# Patient Record
Sex: MALE | Race: WHITE | NOT HISPANIC OR LATINO | Employment: OTHER | URBAN - METROPOLITAN AREA
[De-identification: names, ages, dates, MRNs, and addresses within clinical notes are randomized per-mention and may not be internally consistent; named-entity substitution may affect disease eponyms.]

---

## 2022-05-02 ENCOUNTER — TRANSFERRED RECORDS (OUTPATIENT)
Dept: HEALTH INFORMATION MANAGEMENT | Facility: CLINIC | Age: 77
End: 2022-05-02
Payer: COMMERCIAL

## 2022-06-28 DIAGNOSIS — Z96.1 PSEUDOPHAKIA OF BOTH EYES: ICD-10-CM

## 2022-06-28 DIAGNOSIS — H25.813 COMBINED FORMS OF AGE-RELATED CATARACT, BILATERAL: Primary | ICD-10-CM

## 2022-06-28 DIAGNOSIS — Q12.0 CONGENITAL NUCLEAR CATARACT: ICD-10-CM

## 2022-06-28 DIAGNOSIS — H25.019: ICD-10-CM

## 2022-06-28 DIAGNOSIS — H53.8 BLURRED VISION, BILATERAL: ICD-10-CM

## 2022-06-28 DIAGNOSIS — H25.23 AGE-RELATED CATARACT, MORGAGNIAN TYPE, BILATERAL: ICD-10-CM

## 2022-06-28 DIAGNOSIS — H52.13 MYOPIA OF BOTH EYES: ICD-10-CM

## 2022-06-29 ENCOUNTER — DOCUMENTATION ONLY (OUTPATIENT)
Dept: CARE COORDINATION | Facility: CLINIC | Age: 77
End: 2022-06-29

## 2022-06-29 ENCOUNTER — OFFICE VISIT (OUTPATIENT)
Dept: OPHTHALMOLOGY | Facility: CLINIC | Age: 77
End: 2022-06-29
Attending: OPHTHALMOLOGY
Payer: COMMERCIAL

## 2022-06-29 DIAGNOSIS — H01.01B ULCERATIVE BLEPHARITIS OF UPPER AND LOWER EYELIDS OF BOTH EYES: ICD-10-CM

## 2022-06-29 DIAGNOSIS — H52.13 MYOPIA OF BOTH EYES WITH ASTIGMATISM AND PRESBYOPIA: ICD-10-CM

## 2022-06-29 DIAGNOSIS — H01.01A ULCERATIVE BLEPHARITIS OF UPPER AND LOWER EYELIDS OF BOTH EYES: ICD-10-CM

## 2022-06-29 DIAGNOSIS — H25.813 COMBINED FORMS OF AGE-RELATED CATARACT OF BOTH EYES: Primary | ICD-10-CM

## 2022-06-29 DIAGNOSIS — C67.9 MALIGNANT NEOPLASM OF URINARY BLADDER, UNSPECIFIED SITE (H): ICD-10-CM

## 2022-06-29 DIAGNOSIS — H52.4 MYOPIA OF BOTH EYES WITH ASTIGMATISM AND PRESBYOPIA: ICD-10-CM

## 2022-06-29 DIAGNOSIS — H52.203 MYOPIA OF BOTH EYES WITH ASTIGMATISM AND PRESBYOPIA: ICD-10-CM

## 2022-06-29 PROCEDURE — 99203 OFFICE O/P NEW LOW 30 MIN: CPT | Performed by: OPHTHALMOLOGY

## 2022-06-29 PROCEDURE — G0463 HOSPITAL OUTPT CLINIC VISIT: HCPCS | Mod: 25

## 2022-06-29 RX ORDER — ATORVASTATIN CALCIUM 40 MG/1
40 TABLET, FILM COATED ORAL
COMMUNITY
Start: 2021-10-28 | End: 2023-10-13

## 2022-06-29 RX ORDER — BETAMETHASONE DIPROPIONATE 0.5 MG/G
CREAM TOPICAL
COMMUNITY
Start: 2022-04-08 | End: 2022-10-27

## 2022-06-29 RX ORDER — DONEPEZIL HYDROCHLORIDE 10 MG/1
10 TABLET, FILM COATED ORAL
COMMUNITY
Start: 2022-05-03

## 2022-06-29 RX ORDER — ASPIRIN 81 MG/1
81 TABLET, CHEWABLE ORAL
COMMUNITY

## 2022-06-29 RX ORDER — LOPERAMIDE HCL 2 MG
CAPSULE ORAL
COMMUNITY
Start: 2021-12-21 | End: 2022-12-21

## 2022-06-29 RX ORDER — MEMANTINE HYDROCHLORIDE 10 MG/1
1 TABLET ORAL 2 TIMES DAILY
COMMUNITY
Start: 2022-05-03

## 2022-06-29 RX ORDER — DIVALPROEX SODIUM 125 MG/1
125 CAPSULE, COATED PELLETS ORAL
COMMUNITY
Start: 2022-02-10 | End: 2022-10-27

## 2022-06-29 RX ORDER — FOLIC ACID 1 MG/1
1 TABLET ORAL
COMMUNITY
Start: 2022-02-08

## 2022-06-29 RX ORDER — ATORVASTATIN CALCIUM 40 MG/1
TABLET, FILM COATED ORAL
COMMUNITY
Start: 2022-06-01 | End: 2022-10-27

## 2022-06-29 RX ORDER — DIAZEPAM 10 MG
TABLET ORAL
COMMUNITY
Start: 2022-04-04

## 2022-06-29 ASSESSMENT — TONOMETRY
OD_IOP_MMHG: 12
OS_IOP_MMHG: 13
IOP_METHOD: ICARE

## 2022-06-29 ASSESSMENT — VISUAL ACUITY: METHOD: SNELLEN - LINEAR

## 2022-06-29 ASSESSMENT — CONF VISUAL FIELD: COMMENTS: UNABLE.

## 2022-06-29 ASSESSMENT — SLIT LAMP EXAM - LIDS
COMMENTS: 1+ BLEPHARITIS
COMMENTS: 1+ BLEPHARITIS

## 2022-06-29 NOTE — NURSING NOTE
Chief Complaints and History of Present Illnesses   Patient presents with     Cataract Evaluation     Chief Complaint(s) and History of Present Illness(es)     Cataract Evaluation               Comments     Pt here with his wife today. Per wife, pt has dementia and sometimes becomes combative.  Pt here for cataract eval today, possibly needs anesthesia for Cataract surgery.  Per wife pt states that pt is having difficulty walking because he is unable to see. Pt having difficulty with contrast and depth perception.  No DM.    JULIANN Colbert June 29, 2022 10:40 AM

## 2022-06-29 NOTE — PROGRESS NOTES
Social Work Documentation Only  [facility]    Data/Intervention:  Patient Name:  Barak Houser  /Age:  1945 (76 year old)    Reason for Documentation: Post-Op Planning      Collaborated With:    - Dr. Ku    Intervention/Education/Resources Provided:  Social work referral placed for post-op planning. Per report patient will need inpatient post-op stay with 1:1 nursing.  is unable to approve hospital admissions. Updated provider and gave contact information for Utilization Review for further guidance.     Assessment/Plan:  No current plan to follow-up with patient at this time as concerns are being addressed elsewhere. Encouraged interdisciplinary team to reach out with any further social work needs that arise.    NAEL Coelho/[facility]   [facility]   Phone: [phone]  Pager: [phone]

## 2022-06-29 NOTE — PROGRESS NOTES
HPI     Pt here with his wife today. Per wife, pt has dementia and sometimes becomes combative.  Pt here for cataract eval today, possibly needs anesthesia for Cataract surgery.  Per wife pt states that pt is having difficulty walking because he is unable to see. Pt having difficulty with contrast and depth perception.  No DM.    JULIANN Colbert June 29, 2022 10:40 AM          Last edited by Yuri Galvez COMT on 6/29/2022 10:44 AM. (History)         Review of systems for the eyes was negative other than the pertinent positives/negatives listed in the HPI.      Assessment & Plan    HPI:  Barak Houser is a 76 year old male with history of Alzheimer's Dementia and bladder cancer presents for cataract evaluation from Dr. Hernandez,       POHx: denies prior ocular surgeries  PMHx:   Current Medications: aspirin (ASA) 81 MG chewable tablet, Take 81 mg by mouth  atorvastatin (LIPITOR) 40 MG tablet, Take 40 mg by mouth  atorvastatin (LIPITOR) 40 MG tablet,   augmented betamethasone dipropionate (DIPROLENE AF) 0.05 % external cream,   diazepam (VALIUM) 10 MG tablet, TAKE 1 TABLET BY MOUTH 30 MINUTES PRIOR TO MRI  divalproex sodium delayed-release (DEPAKOTE SPRINKLE) 125 MG DR capsule, Take 125 mg by mouth  donepezil (ARICEPT) 10 MG tablet, Take 10 mg by mouth  folic acid (FOLVITE) 1 MG tablet, Take 1 mg by mouth  loperamide (IMODIUM) 2 MG capsule, TAKE 2 CAPSULES BY MOUTH INITIALLY, THEN ONE CAPSULE BY MOUTH AFTER EACH LOOSE STOOL. MAXIMUM 8 DOSES PER DAY.  memantine (NAMENDA) 10 MG tablet, Take 1 tablet by mouth 2 times daily  methotrexate 2.5 MG tablet,     No current facility-administered medications on file prior to visit.    FHx: denies family history of ocular conditions   PSHx: denies history of ocular surgeries       Current Eye Medications:      Assessment & Plan:  (H25.813) Combined forms of age-related cataract of both eyes  (primary encounter diagnosis)  Special equipment/needs:  Eye:  ocp        Last Written Prescription Date: 09-05-16  Last Fill Quantity: 84,  # refills: 3   Last Office Visit with G, P or [facility] prescribing provider: 12-27-13    Bianca Pritchard  [facility] RN                                                Left  Anesthesia: General  Dilates to: 7mm  Iris expansion:  No  Pseudoexfoliation: No  Trypan Blue: Yes  Trauma: No    Able lay to flat: Yes  Blood Thinner: No previously on ASA   Tamsulosin: No  DM: No  Guttae: No    Dominant Eye: right    Plan: [place] goal    We discussed the benefits, alternatives, and risks to cataract surgery, including blindness, decreased vision, and need for additional surgeries; and informed consent was obtained.  Proceed with CE/IOL left eye, possible right same day    Consider primary posterior capsulotomy    Needs general anesthesia and inpatient post-op stay with 1:1 nursing    (C67.9) Malignant neoplasm of urinary bladder, unspecified site (H)  No flomax history per wife and epic    (H01.01A,  H01.01B) Ulcerative blepharitis of upper and lower eyelids of both eyes    (H52.13,  H52.203,  H52.4) Myopia of both eyes with astigmatism and presbyopia  Prior MR right eye -3.25+2.25x037, left eye -0.25+0.50x165        No follow-ups on file.        Abdifatah Ku MD     Attending Physician Attestation:  Complete documentation of historical and exam elements from today's encounter can be found in the full encounter summary report (not reduplicated in this progress note).  I personally obtained the chief complaint(s) and history of present illness.  I confirmed and edited as necessary the review of systems, past medical/surgical history, family history, social history, and examination findings as documented by others; and I examined the patient myself.  I personally reviewed the relevant tests, images, and reports as documented above.  I formulated and edited as necessary the assessment and plan and discussed the findings and management plan with the patient and family. - Abdifatah Ku MD

## 2022-08-03 ENCOUNTER — TELEPHONE (OUTPATIENT)
Dept: OPHTHALMOLOGY | Facility: CLINIC | Age: 77
End: 2022-08-03

## 2022-08-03 NOTE — TELEPHONE ENCOUNTER
Tonie Houser [phone]      -reviewed by surgery scheduler  Reviewed with daughter surgery scheduling working on coordinating with [facility] OR and taking time for approval/finding time    Pt at memory care and needing overnight stay also per note    Reviewed earliest likely to confirm would be next week    Daughter verbally demonstrated understanding    Henrique Hernandez, RN 2:06 PM 08/03/22

## 2022-08-03 NOTE — TELEPHONE ENCOUNTER
Note to Dr. Ku to ensure all orders in place and surgery scheduler to reach out for scheduling    Henrique Hernandez RN 1:50 PM 08/03/22          [facility]    Phone Message    May a detailed message be left on voicemail: yes     Reason for Call: Other: Per Pts wife Tonie , someone was suppose to reach out to them to schedule a surgery but she has not heard anything since pts last appt . Pts referring provider is pressing that he have this surgery and to follow up on an update on if pt is still a canidate for the surgery . Please reach out to pts wife to discuss as soon as possible . Thank you       Action Taken: Other: eye     Travel Screening: Not Applicable

## 2022-08-15 ENCOUNTER — TELEPHONE (OUTPATIENT)
Dept: OPHTHALMOLOGY | Facility: CLINIC | Age: 77
End: 2022-08-15

## 2022-08-15 NOTE — TELEPHONE ENCOUNTER
Called patient to schedule surgery with Dr. Ku    Date of Surgery: 10/31    Location of surgery: [facility]    Pre-Op H&P: PCP/facility    Imaging Scheduled:  No    Discussed COVID-19 Testing: No Patient tested positive last week does not need a new test. Patients wife will get proof of positive    Post-Op Appt Date: 11/1,11/16,11/30    Surgery Packet Mailed: yes      Additional comments: Spoke with patients wife to schedule she was very happy, will review packet and call with any questions.        Anna C. Schoenecker on 8/15/2022 at 11:41 AM

## 2022-09-07 ENCOUNTER — TELEPHONE (OUTPATIENT)
Dept: OPHTHALMOLOGY | Facility: CLINIC | Age: 77
End: 2022-09-07

## 2022-09-07 NOTE — TELEPHONE ENCOUNTER
Patients Nursing home reached out to go over care instruction that will be needed post operatively and request a call back from MD or RN.    [phone]- ask for Marla Anna C. Schoenecker on 9/7/2022 at 12:26 PM

## 2022-09-30 ENCOUNTER — PATIENT OUTREACH (OUTPATIENT)
Dept: CARE COORDINATION | Facility: CLINIC | Age: 77
End: 2022-09-30

## 2022-09-30 NOTE — PROGRESS NOTES
"Social Work Assessment and Intervention  [facility]    Data/Intervention:    Patient Name:  Barak Houser  /Age:  1945 (76 year old)    Visit Type: telephone  Referral Source: Eye clinic  Reason for Referral:  Care after cataract surgery with anaesthesia, need for 24/7 care post-op    Collaborated With:    -Barak's wife Tonie Jessicatangela      Psychosocial Data/Patient Concerns/Issues:     Living Situation: Barak lives in a memory care [facility] in [place]. Tonie discussed there being only a couple of nurses, a CNA, and LPN on staff and there are a lot of residents for them to care for so staff there will not be able to be with Barak 24/7 after surgery. Tonie did say she agrees that Barak will need someone with him 24/7 post-op and reported thinking that Dr. Ku told her Barak will need someone with him 24/7 for 5 days post-op.     Tonie did note that the [facility] staff will do Barak's eye drops after surgery.     Tonie noted that Barak has a walker but it has been hard for him to use it due to his vision problems.     Family/Support Network: Tonie noted that she works two part times jobs, which amounts to 6 days/week but she is very involved in Barak's life and very supportive.     I asked if there are children who could be a part of a caregiver rotation schedule and Tonie reported she and Barak do not have children. Tonie also noted that their friends are all working, so there really is no family/friend caregiver rotation option.     Tonie reported really wanting Barak to get cataract surgery done so he can see better, walk as much as possible, and noted he is still getting annia out of life and \"everyone loves him\". Tonie reported really wanting Barak to get the surgery but not wanting it to be a catastrophe.   Tonie discussed her plan of getting an eye patch/shield like Barak will wear post-op (from [employer] or a pharmacy) and doing a trial run with this maybe the week " prior to surgery to see how it goes. Tonie noted that Barak has never been bothered by anything like a bandaid before and didn't touch his incision after having abdominal surgery. Tonie did note she has seen Barak rubbing his eyes recently when he wakes up in the morning and said she thinks he is trying to rub the blurriness away.     Tonie noted she has already done eye drops for Barak and did a type of eye dressing the other day as well.     Financial/Insurance: Barak has [facility] Medicare Advantage insurance and I explained that Barak would need insurance approval to go to a TCU if return to [facility] with private duty help isn't an option. I explained that I do not foresee Barak having what would be called a skilled need for TCU/insurance coverage, but having long term needs (I.e. someone to monitor him).     Of note, a TCU also would not have staff to have someone with Barak 24/7, so this really would not be a feasible option.     Education/Employment: Did not discuss.    Community/Supportive Resources: Tonie reported that Barak has a MD with [facility] who is really great.  I discussed the option of hiring a private pay home health aid to be with Barak when Tonie can't be but advised that Tonie check with [facility] to ensure they will allow outside staff in, to which Tonie said she has checked on already and it will be ok. I emailed Tonie ([email or web address]) a list of agencies for this and explained she will need to do some research on her end about this to determine what paid caregiver schedule will be needed and what cost will work for her.     Adjustment to Illness: Unable to assess with Barak.    Caregiver Coping: Tonie presents as adjusting appropriately and is taking appropriate steps in planning and preparing for upcoming surgery for Barak.     Advance Care Planning/Legal: Tonie reported that Barak has a health care directive and financial POA and she will fax these to me.     Mental/Chemical  Health Issues & Coping: Tonie reported that Barak had been on Depakote but she had him taken off of that because he was experiencing all of the side effects of it and has been doing better since stopping this.     Cultural/Sikh Factors: NA    Patient/Care Partner Goals: For Barak to have a successful upcoming surgery and smooth post-op period    Strengths/Barriers/Concerns:   - Tonie is very involved, supportive, and invested in doing anything she can to help Barak have successful surgery and recovery  - Tonie noted really liking [facility]      Intervention/Education/Resources Provided:   - Provided brief psychosocial assessment of patient/family coping, resource needs and current supports.  - Provided supportive listening, validation, information, coping strategies and brief counseling as needed.  - Provided information and facilitated linkage with community resources as needed.   - Collaborated with healthcare team and professionals in community to meet patient/family needs as appropriate.   - Addressed future care planning needs and questions and provided resource information  - Addressed importance of Advance Care planning documents and facilitated completion or obtaining copies for the medical record as needed.    Assessment/Plan:    I or my colleague Lashell will follow up with Tonie in a couple weeks to see where she is with securing private pay help.     Provided patient/family with contact information and availability.    NAEL Bergeron, Health system    [facility]  Ph: [phone], Pgr: [phone]  9/30/2022

## 2022-10-10 ENCOUNTER — DOCUMENTATION ONLY (OUTPATIENT)
Dept: OTHER | Facility: CLINIC | Age: 77
End: 2022-10-10

## 2022-10-10 ENCOUNTER — PATIENT OUTREACH (OUTPATIENT)
Dept: CARE COORDINATION | Facility: CLINIC | Age: 77
End: 2022-10-10

## 2022-10-10 NOTE — PROGRESS NOTES
"Social Work Follow-Up  [facility]    Data/Intervention:  Patient Name:  Barak Houser  /Age:  1945 (76 year old)    Reason for Follow-Up:  Request from colleague to follow up with spouse re post op plans    Collaborated With:    Lai    Intervention/Education/Resources Provided:  Provided info to Pt's wife re aftercare for Barak:  \"This patient is going under general anesthesia for cataract surgery in one, or maybe both eyes, on 10/31. After surgery, he will have clear shield on his eye. It is imperative that he not rub his eye after surgery as he could cause his eye to open up and lead to infection, vision loss, and more surgeries.     I think he will need 1:1 for at least 24 hours, if not 5 days.     I typically place patient on eye drops 4 times daily for 1 week, three times a day for 1 week, twice a day x 1 week and daily for 1 week, but I will see if I can limit those post-op drops. If I put him on drops, I will try to use a combination drop so it is only one drop each time.\"    Tonie indicated that she hasn't made any arrangements yet but is planning to contact her friends to each take a 4 hour shift to be with him at least 24 hours. She is aware that up to 5 days is best if possible but she states she can't afford to hire 24 hour care for that long.       Assessment/Plan:  Pt's wife is aware of what is required and is planning to meet with the staff at his [facility] and to talk with her friends about sitting with him after surgery. Remain available.     Previously provided patient/family with writer's contact information and availability.       Lashell Colbert, NAEL, [facility]    [facility]  [phone]  "

## 2022-10-27 ENCOUNTER — TELEPHONE (OUTPATIENT)
Dept: OPHTHALMOLOGY | Facility: CLINIC | Age: 77
End: 2022-10-27

## 2022-10-27 NOTE — TELEPHONE ENCOUNTER
Patients spouse called with lots of concerns about the upcoming surgery. She received a call from Dianna BERMAN [phone] about his upcoming surgery saying this was out patient and not inpatient. She was under the impression that he would be admitted given his alzheimer's. She would prefer him to be admitted  As she does not have the ability to care for him for the time after as he constantly needs redirection.    Anna C. Schoenecker on 10/27/2022 at 3:20 PM

## 2022-10-28 ENCOUNTER — TELEPHONE (OUTPATIENT)
Dept: OPHTHALMOLOGY | Facility: CLINIC | Age: 77
End: 2022-10-28

## 2022-10-28 NOTE — TELEPHONE ENCOUNTER
Ila [phone] care coordinator at [facility] vision.      Reviewed shield at night patching minimum of one week and likely two week with pt's history of dementia and possibly longer if tends to rub eyes.    Reviewed may need to shield during day if has tendency to rub eyes.    Reviewed will need to wear glasses or sunglasses during day during that time also.    Reviewed possible wound suture would be needed, but either way should not change post-op care.    Reviewed left eye only confirmed and may review at surgery day possibly right eye.    Care coordinator verbally demonstrated understanding    Note to Dr. Ku for review and amendment to information if applicable.    Henrique Hernandez RN 3:42 PM 10/28/22            [facility]    Phone Message    May a detailed message be left on voicemail: yes     Reason for Call: Other: Pt's care coordinator called and would like to know if there are sutures used on every case and how long the pt will have to wear a patch for. Due to his demansia they are trying to practice with a patch and seeing if he will leave it on. Please contact Ila to discuss    Thank you      Action Taken: Message routed to:  [facility] ([facility] eye    Travel Screening: Not Applicable

## 2022-10-31 ENCOUNTER — ANESTHESIA EVENT (OUTPATIENT)
Dept: SURGERY | Facility: CLINIC | Age: 77
End: 2022-10-31
Payer: COMMERCIAL

## 2022-10-31 ENCOUNTER — HOSPITAL ENCOUNTER (OUTPATIENT)
Facility: CLINIC | Age: 77
Discharge: OTHER INSTITUTION | End: 2022-10-31
Attending: OPHTHALMOLOGY | Admitting: OPHTHALMOLOGY
Payer: COMMERCIAL

## 2022-10-31 ENCOUNTER — ANESTHESIA (OUTPATIENT)
Dept: SURGERY | Facility: CLINIC | Age: 77
End: 2022-10-31
Payer: COMMERCIAL

## 2022-10-31 VITALS
RESPIRATION RATE: 14 BRPM | SYSTOLIC BLOOD PRESSURE: 118 MMHG | BODY MASS INDEX: 23.52 KG/M2 | HEART RATE: 66 BPM | DIASTOLIC BLOOD PRESSURE: 71 MMHG | TEMPERATURE: 97.9 F | OXYGEN SATURATION: 97 % | WEIGHT: 155.2 LBS | HEIGHT: 68 IN

## 2022-10-31 DIAGNOSIS — H25.812 COMBINED FORM OF AGE-RELATED CATARACT, LEFT EYE: Primary | ICD-10-CM

## 2022-10-31 DIAGNOSIS — H25.813 COMBINED FORM OF AGE-RELATED CATARACT, BOTH EYES: ICD-10-CM

## 2022-10-31 LAB — GLUCOSE BLDC GLUCOMTR-MCNC: 81 MG/DL (ref 70–99)

## 2022-10-31 PROCEDURE — 710N000010 HC RECOVERY PHASE 1, LEVEL 2, PER MIN: Performed by: OPHTHALMOLOGY

## 2022-10-31 PROCEDURE — V2632 POST CHMBR INTRAOCULAR LENS: HCPCS | Performed by: OPHTHALMOLOGY

## 2022-10-31 PROCEDURE — 250N000011 HC RX IP 250 OP 636: Performed by: OPHTHALMOLOGY

## 2022-10-31 PROCEDURE — 999N000141 HC STATISTIC PRE-PROCEDURE NURSING ASSESSMENT: Performed by: OPHTHALMOLOGY

## 2022-10-31 PROCEDURE — 250N000009 HC RX 250: Performed by: NURSE ANESTHETIST, CERTIFIED REGISTERED

## 2022-10-31 PROCEDURE — 360N000076 HC SURGERY LEVEL 3, PER MIN: Performed by: OPHTHALMOLOGY

## 2022-10-31 PROCEDURE — 258N000003 HC RX IP 258 OP 636: Performed by: NURSE ANESTHETIST, CERTIFIED REGISTERED

## 2022-10-31 PROCEDURE — 250N000009 HC RX 250: Performed by: OPHTHALMOLOGY

## 2022-10-31 PROCEDURE — 370N000017 HC ANESTHESIA TECHNICAL FEE, PER MIN: Performed by: OPHTHALMOLOGY

## 2022-10-31 PROCEDURE — 710N000012 HC RECOVERY PHASE 2, PER MINUTE: Performed by: OPHTHALMOLOGY

## 2022-10-31 PROCEDURE — 250N000025 HC SEVOFLURANE, PER MIN: Performed by: OPHTHALMOLOGY

## 2022-10-31 PROCEDURE — 82962 GLUCOSE BLOOD TEST: CPT

## 2022-10-31 PROCEDURE — 250N000011 HC RX IP 250 OP 636: Performed by: NURSE ANESTHETIST, CERTIFIED REGISTERED

## 2022-10-31 PROCEDURE — 272N000001 HC OR GENERAL SUPPLY STERILE: Performed by: OPHTHALMOLOGY

## 2022-10-31 PROCEDURE — 66984 XCAPSL CTRC RMVL W/O ECP: CPT | Mod: 50 | Performed by: OPHTHALMOLOGY

## 2022-10-31 DEVICE — EYE IMP IOL ALCON ACRYSOF UV SA60WF 20.5D: Type: IMPLANTABLE DEVICE | Site: EYE | Status: FUNCTIONAL

## 2022-10-31 RX ORDER — FENTANYL CITRATE 50 UG/ML
25 INJECTION, SOLUTION INTRAMUSCULAR; INTRAVENOUS EVERY 5 MIN PRN
Status: DISCONTINUED | OUTPATIENT
Start: 2022-10-31 | End: 2022-10-31

## 2022-10-31 RX ORDER — ONDANSETRON 4 MG/1
4 TABLET, ORALLY DISINTEGRATING ORAL EVERY 30 MIN PRN
Status: DISCONTINUED | OUTPATIENT
Start: 2022-10-31 | End: 2022-10-31 | Stop reason: HOSPADM

## 2022-10-31 RX ORDER — SODIUM CHLORIDE, SODIUM LACTATE, POTASSIUM CHLORIDE, CALCIUM CHLORIDE 600; 310; 30; 20 MG/100ML; MG/100ML; MG/100ML; MG/100ML
INJECTION, SOLUTION INTRAVENOUS CONTINUOUS
Status: DISCONTINUED | OUTPATIENT
Start: 2022-10-31 | End: 2022-10-31 | Stop reason: HOSPADM

## 2022-10-31 RX ORDER — OXYCODONE HYDROCHLORIDE 5 MG/1
5 TABLET ORAL EVERY 4 HOURS PRN
Status: DISCONTINUED | OUTPATIENT
Start: 2022-10-31 | End: 2022-10-31 | Stop reason: HOSPADM

## 2022-10-31 RX ORDER — PROPARACAINE HYDROCHLORIDE 5 MG/ML
1 SOLUTION/ DROPS OPHTHALMIC
Status: DISCONTINUED | OUTPATIENT
Start: 2022-10-31 | End: 2022-10-31

## 2022-10-31 RX ORDER — ONDANSETRON 4 MG/1
4 TABLET, ORALLY DISINTEGRATING ORAL EVERY 30 MIN PRN
Status: DISCONTINUED | OUTPATIENT
Start: 2022-10-31 | End: 2022-10-31

## 2022-10-31 RX ORDER — BALANCED SALT SOLUTION 6.4; .75; .48; .3; 3.9; 1.7 MG/ML; MG/ML; MG/ML; MG/ML; MG/ML; MG/ML
SOLUTION OPHTHALMIC PRN
Status: DISCONTINUED | OUTPATIENT
Start: 2022-10-31 | End: 2022-10-31 | Stop reason: HOSPADM

## 2022-10-31 RX ORDER — HYDROMORPHONE HCL IN WATER/PF 6 MG/30 ML
0.2 PATIENT CONTROLLED ANALGESIA SYRINGE INTRAVENOUS EVERY 5 MIN PRN
Status: DISCONTINUED | OUTPATIENT
Start: 2022-10-31 | End: 2022-10-31 | Stop reason: HOSPADM

## 2022-10-31 RX ORDER — ONDANSETRON 2 MG/ML
4 INJECTION INTRAMUSCULAR; INTRAVENOUS EVERY 30 MIN PRN
Status: DISCONTINUED | OUTPATIENT
Start: 2022-10-31 | End: 2022-10-31

## 2022-10-31 RX ORDER — NALOXONE HYDROCHLORIDE 0.4 MG/ML
0.4 INJECTION, SOLUTION INTRAMUSCULAR; INTRAVENOUS; SUBCUTANEOUS
Status: DISCONTINUED | OUTPATIENT
Start: 2022-10-31 | End: 2022-10-31 | Stop reason: HOSPADM

## 2022-10-31 RX ORDER — SODIUM CHLORIDE, SODIUM LACTATE, POTASSIUM CHLORIDE, CALCIUM CHLORIDE 600; 310; 30; 20 MG/100ML; MG/100ML; MG/100ML; MG/100ML
INJECTION, SOLUTION INTRAVENOUS CONTINUOUS
Status: DISCONTINUED | OUTPATIENT
Start: 2022-10-31 | End: 2022-10-31

## 2022-10-31 RX ORDER — DICLOFENAC SODIUM 1 MG/ML
1 SOLUTION/ DROPS OPHTHALMIC
Status: COMPLETED | OUTPATIENT
Start: 2022-10-31 | End: 2022-10-31

## 2022-10-31 RX ORDER — ONDANSETRON 2 MG/ML
4 INJECTION INTRAMUSCULAR; INTRAVENOUS EVERY 30 MIN PRN
Status: DISCONTINUED | OUTPATIENT
Start: 2022-10-31 | End: 2022-10-31 | Stop reason: HOSPADM

## 2022-10-31 RX ORDER — FENTANYL CITRATE 50 UG/ML
25 INJECTION, SOLUTION INTRAMUSCULAR; INTRAVENOUS
Status: DISCONTINUED | OUTPATIENT
Start: 2022-10-31 | End: 2022-10-31

## 2022-10-31 RX ORDER — OXYCODONE HYDROCHLORIDE 5 MG/1
5 TABLET ORAL EVERY 4 HOURS PRN
Status: DISCONTINUED | OUTPATIENT
Start: 2022-10-31 | End: 2022-10-31

## 2022-10-31 RX ORDER — FENTANYL CITRATE 50 UG/ML
25 INJECTION, SOLUTION INTRAMUSCULAR; INTRAVENOUS EVERY 5 MIN PRN
Status: DISCONTINUED | OUTPATIENT
Start: 2022-10-31 | End: 2022-10-31 | Stop reason: HOSPADM

## 2022-10-31 RX ORDER — CYCLOPENTOLAT/TROPIC/PHENYLEPH 1%-1%-2.5%
1 DROPS (EA) OPHTHALMIC (EYE)
Status: COMPLETED | OUTPATIENT
Start: 2022-10-31 | End: 2022-10-31

## 2022-10-31 RX ORDER — MEPERIDINE HYDROCHLORIDE 25 MG/ML
12.5 INJECTION INTRAMUSCULAR; INTRAVENOUS; SUBCUTANEOUS
Status: DISCONTINUED | OUTPATIENT
Start: 2022-10-31 | End: 2022-10-31

## 2022-10-31 RX ORDER — MOXIFLOXACIN IN NACL,ISO-OS/PF 1.6 MG/ML
SYRINGE (ML) INTRAOCULAR PRN
Status: DISCONTINUED | OUTPATIENT
Start: 2022-10-31 | End: 2022-10-31 | Stop reason: HOSPADM

## 2022-10-31 RX ORDER — HYDROMORPHONE HYDROCHLORIDE 1 MG/ML
0.2 INJECTION, SOLUTION INTRAMUSCULAR; INTRAVENOUS; SUBCUTANEOUS EVERY 5 MIN PRN
Status: DISCONTINUED | OUTPATIENT
Start: 2022-10-31 | End: 2022-10-31 | Stop reason: HOSPADM

## 2022-10-31 RX ORDER — MOXIFLOXACIN 5 MG/ML
1 SOLUTION/ DROPS OPHTHALMIC
Status: COMPLETED | OUTPATIENT
Start: 2022-10-31 | End: 2022-10-31

## 2022-10-31 RX ORDER — EPHEDRINE SULFATE 50 MG/ML
INJECTION, SOLUTION INTRAMUSCULAR; INTRAVENOUS; SUBCUTANEOUS PRN
Status: DISCONTINUED | OUTPATIENT
Start: 2022-10-31 | End: 2022-10-31

## 2022-10-31 RX ORDER — NALOXONE HYDROCHLORIDE 0.4 MG/ML
0.2 INJECTION, SOLUTION INTRAMUSCULAR; INTRAVENOUS; SUBCUTANEOUS
Status: DISCONTINUED | OUTPATIENT
Start: 2022-10-31 | End: 2022-10-31 | Stop reason: HOSPADM

## 2022-10-31 RX ORDER — FENTANYL CITRATE 50 UG/ML
25 INJECTION, SOLUTION INTRAMUSCULAR; INTRAVENOUS
Status: DISCONTINUED | OUTPATIENT
Start: 2022-10-31 | End: 2022-10-31 | Stop reason: HOSPADM

## 2022-10-31 RX ORDER — KETOROLAC TROMETHAMINE 30 MG/ML
INJECTION, SOLUTION INTRAMUSCULAR; INTRAVENOUS PRN
Status: DISCONTINUED | OUTPATIENT
Start: 2022-10-31 | End: 2022-10-31

## 2022-10-31 RX ORDER — MEPERIDINE HYDROCHLORIDE 25 MG/ML
12.5 INJECTION INTRAMUSCULAR; INTRAVENOUS; SUBCUTANEOUS
Status: DISCONTINUED | OUTPATIENT
Start: 2022-10-31 | End: 2022-10-31 | Stop reason: HOSPADM

## 2022-10-31 RX ORDER — SODIUM CHLORIDE, SODIUM LACTATE, POTASSIUM CHLORIDE, CALCIUM CHLORIDE 600; 310; 30; 20 MG/100ML; MG/100ML; MG/100ML; MG/100ML
INJECTION, SOLUTION INTRAVENOUS CONTINUOUS PRN
Status: DISCONTINUED | OUTPATIENT
Start: 2022-10-31 | End: 2022-10-31

## 2022-10-31 RX ORDER — PROPARACAINE HYDROCHLORIDE 5 MG/ML
1 SOLUTION/ DROPS OPHTHALMIC ONCE
Status: COMPLETED | OUTPATIENT
Start: 2022-10-31 | End: 2022-10-31

## 2022-10-31 RX ADMIN — DICLOFENAC SODIUM 1 DROP: 1 SOLUTION OPHTHALMIC at 10:45

## 2022-10-31 RX ADMIN — Medication 1 DROP: at 10:43

## 2022-10-31 RX ADMIN — DICLOFENAC SODIUM 1 DROP: 1 SOLUTION OPHTHALMIC at 10:51

## 2022-10-31 RX ADMIN — MOXIFLOXACIN OPHTHALMIC SOLUTION 1 DROP: 5 SOLUTION/ DROPS OPHTHALMIC at 10:50

## 2022-10-31 RX ADMIN — Medication 1 DROP: at 10:56

## 2022-10-31 RX ADMIN — MOXIFLOXACIN OPHTHALMIC SOLUTION 1 DROP: 5 SOLUTION/ DROPS OPHTHALMIC at 10:58

## 2022-10-31 RX ADMIN — MOXIFLOXACIN OPHTHALMIC SOLUTION 1 DROP: 5 SOLUTION/ DROPS OPHTHALMIC at 10:44

## 2022-10-31 RX ADMIN — Medication 1 DROP: at 10:49

## 2022-10-31 RX ADMIN — DICLOFENAC SODIUM 1 DROP: 1 SOLUTION OPHTHALMIC at 10:59

## 2022-10-31 RX ADMIN — KETOROLAC TROMETHAMINE 30 MG: 30 INJECTION, SOLUTION INTRAMUSCULAR at 15:33

## 2022-10-31 RX ADMIN — PROPARACAINE HYDROCHLORIDE 1 DROP: 5 SOLUTION/ DROPS OPHTHALMIC at 10:38

## 2022-10-31 RX ADMIN — SODIUM CHLORIDE, POTASSIUM CHLORIDE, SODIUM LACTATE AND CALCIUM CHLORIDE: 600; 310; 30; 20 INJECTION, SOLUTION INTRAVENOUS at 13:02

## 2022-10-31 RX ADMIN — Medication 5 MG: at 13:55

## 2022-10-31 ASSESSMENT — ACTIVITIES OF DAILY LIVING (ADL)
ADLS_ACUITY_SCORE: 39
ADLS_ACUITY_SCORE: 35
ADLS_ACUITY_SCORE: 39

## 2022-10-31 NOTE — OR NURSING
Discharge instructions reviewed with patient's wife Tonie and patient's sister Zunilda. Verbalized understanding. They will have someone staying with him throughout the night to make sure he does not rub his eyes.     Called [facility] and gave discharge instructions to Micaela who identified herself as a caregiver/. She stated there was no nurse on duty right now who could take report. Discharge instructions will be sent with patient, wife will give instructions to facility.

## 2022-10-31 NOTE — ANESTHESIA CARE TRANSFER NOTE
Patient: Barak Soto    Procedure: Procedure(s):  LEFT and  RIGHT PHACOEMULSIFICATION, CATARACT, WITH INTRAOCULAR LENS IMPLANT       Diagnosis: Combined forms of age-related cataract of both eyes [H25.813]  Diagnosis Additional Information: No value filed.    Anesthesia Type:   General     Note:    Oropharynx: oropharynx clear of all foreign objects  Level of Consciousness: drowsy  Oxygen Supplementation: face mask    Independent Airway: airway patency satisfactory and stable  Dentition: dentition unchanged  Vital Signs Stable: post-procedure vital signs reviewed and stable  Report to RN Given: handoff report given  Patient transferred to: PACU    Handoff Report: Identifed the Patient, Identified the Reponsible Provider, Reviewed the pertinent medical history, Discussed the surgical course, Reviewed Intra-OP anesthesia mangement and issues during anesthesia, Set expectations for post-procedure period and Allowed opportunity for questions and acknowledgement of understanding      Vitals:  Vitals Value Taken Time   /76 10/31/22 1645   Temp 36.7  C (98.1  F) 10/31/22 1645   Pulse 70 10/31/22 1645   Resp 11 10/31/22 1645   SpO2 97 % 10/31/22 1645       Electronically Signed By: Amari Philippe MD  October 31, 2022  6:03 PM

## 2022-10-31 NOTE — OP NOTE
PREOPERATIVE DIAGNOSIS:   1. Combined form of age-related cataract, left eye     Bilateral eye   POSTOPERATIVE DIAGNOSIS: Same   PROCEDURES:   1. Cataract extraction with intraocular lens implant Bilateral eye.  SURGEON: Abdifatah Ku M.D.  INDICATIONS: The patient Barak Soto presented to the eye clinic with decreased vision secondary to cataract in the Bilateral eye. The risks, benefits and alternatives to cataract extraction were discussed. The medical decision maker, Tonie Mcknight, agreed to same day bilateral cataract surgery to due to sever visual impairment, difficulty with prolonged post-operative care with his diminished mental status The patient and his medical decision maker elected to proceed. All questions were answered to the patient's satisfaction.   DESCRIPTION OF PROCEDURE:   Prior to the procedure, appropriate cardiac and respiratory monitors were applied to the patient.  In the pre-operative holding area, a drop of topical tetracaine was placed in the operative eye.  The patient was brought to the operating room.  Under general anesthesia, manual keratometry and a scan were performed. The left eye was prepped and draped in the usual sterile fashion. A lid speculum was placed, and the operating microscope was rotated into position. A surgical pause was carried out to identify with all members of the surgical team the correct surgical site. A paracentesis was created.  Through this limbal paracentesis, the anterior chamber was filled with preservative-free lidocaine followed by viscoelastic.  A temporal wound was created at the limbus using a 2.6 mm blade. A capsulorrhexis was initiated using a bent 25-gauge needle and was completed in continuous and circular fashion using the capsulorrhexis forceps. The lens nucleus was hydrodissected using balanced salt solution.  The lens nucleus was rotated and removed using phacoemulsification in a divide and conquer technique.  Residual cortical  material was removed using irrigation-aspiration.  The capsular bag was reinflated to its maximal extent with cohesive viscoelastic.  A 20.5 diopter [id number] was inserted into the capsular bag.  The lens power selected was reviewed using the intraocular lens power measurements that were obtained preoperatively to confirm that the correct lens was selected for the desired post-operative refractive state. The residual viscoelastic was removed in its entirety, the wound were hydrated and found to be self-sealing. A 10-0 nylon suture was placed Intracameral moxifloxacin was administered. Tactile pressure was confirmed to be in a normal range.  The lid speculum was removed and a shield was applied.     The room was turned over in its entirety with no cross use from left to right eye    The right eye was prepped and draped in the usual sterile fashion. A lid speculum was placed, and the operating microscope was rotated into position. A surgical pause was carried out to identify with all members of the surgical team the correct surgical site. A paracentesis was created.  Through this limbal paracentesis, the anterior chamber was filled with preservative-free lidocaine followed by viscoelastic.  A temporal wound was created at the limbus using a 2.6 mm blade. A capsulorrhexis was initiated using a bent 25-gauge needle and was completed in continuous and circular fashion using the capsulorrhexis forceps. The lens nucleus was hydrodissected using balanced salt solution.  The lens nucleus was rotated and removed using phacoemulsification in a divide and conquer technique.  Residual cortical material was removed using irrigation-aspiration.  The capsular bag was reinflated to its maximal extent with cohesive viscoelastic.  A 20.5 diopter [id number] was inserted into the capsular bag.  The lens power selected was reviewed using the intraocular lens power measurements that were obtained preoperatively to confirm that the correct lens  was selected for the desired post-operative refractive state. The residual viscoelastic was removed in its entirety, the wound were hydrated and found to be self-sealing. A 10-0 nylon suture was placed Intracameral moxifloxacin was administered. Tactile pressure was confirmed to be in a normal range.  The lid speculum was removed and a shield was applied.  The patient tolerated the procedure well, and there were no complications.      PLAN: The patient will be monitored for admission or discharge to home and will follow up tomorrow  EBL:  None  Complications:  None  * No implants in log *

## 2022-10-31 NOTE — ANESTHESIA PREPROCEDURE EVALUATION
"Anesthesia Pre-Procedure Evaluation    Patient: Barak Soto   MRN: [id number] : 1945        Procedure : Procedure(s):  LEFT POSSIBLE RIGHT PHACOEMULSIFICATION, CATARACT, WITH INTRAOCULAR LENS IMPLANT          No past medical history on file.   No past surgical history on file.   Allergies   Allergen Reactions     Olanzapine Other (See Comments)     Spouse reports \"went off the walls crazy\" for one hour after taking first and only dose      Social History     Tobacco Use     Smoking status: Former     Smokeless tobacco: Never   Substance Use Topics     Alcohol use: Not on file      Wt Readings from Last 1 Encounters:   10/31/22 70.4 kg (155 lb 3.3 oz)        Anesthesia Evaluation            ROS/MED HX  ENT/Pulmonary:       Neurologic:     (+) dementia,     Cardiovascular:       METS/Exercise Tolerance:     Hematologic:       Musculoskeletal: Comment: RA  (+) arthritis,     GI/Hepatic:       Renal/Genitourinary:       Endo:       Psychiatric/Substance Use:       Infectious Disease:       Malignancy:       Other:            Physical Exam    Airway        Mallampati: II   TM distance: > 3 FB   Neck ROM: full   Mouth opening: > 3 cm    Respiratory Devices and Support         Dental  no notable dental history         Cardiovascular   cardiovascular exam normal          Pulmonary   pulmonary exam normal                OUTSIDE LABS:  CBC: No results found for: WBC, HGB, HCT, PLT  BMP:   Lab Results   Component Value Date    GLC 81 10/31/2022     COAGS: No results found for: PTT, INR, FIBR  POC: No results found for: BGM, HCG, HCGS  HEPATIC: No results found for: ALBUMIN, PROTTOTAL, ALT, AST, GGT, ALKPHOS, BILITOTAL, BILIDIRECT, RAISA  OTHER: No results found for: PH, LACT, A1C, KYLER, PHOS, MAG, LIPASE, AMYLASE, TSH, T4, T3, CRP, SED    Anesthesia Plan    ASA Status:  3      Anesthesia Type: General.     - Airway: LMA   Induction: Intravenous.   Maintenance: Balanced.        Consents    Anesthesia Plan(s) and " associated risks, benefits, and realistic alternatives discussed. Questions answered and patient/representative(s) expressed understanding.    - Discussed:     - Discussed with:  Patient         Postoperative Care    Pain management: IV analgesics, Oral pain medications.   PONV prophylaxis: Ondansetron (or other 5HT-3)     Comments:                Winston Muñiz MD

## 2022-10-31 NOTE — DISCHARGE INSTRUCTIONS
Same-Day Surgery   Adult Discharge Orders & Instructions     For 24 hours after surgery:  Get plenty of rest.  A responsible adult must stay with you for at least 24 hours after you leave the hospital.   Pain medication can slow your reflexes. Do not drive or use heavy equipment.  If you have weakness or tingling, don't drive or use heavy equipment until this feeling goes away.  Mixing alcohol and pain medication can cause dizziness and slow your breathing. It can even be fatal. Do not drink alcohol while taking pain medication.  Avoid strenuous or risky activities.  Ask for help when climbing stairs.   You may feel lightheaded.  If so, sit for a few minutes before standing.  Have someone help you get up.   If you have nausea (feel sick to your stomach), drink only clear liquids such as apple juice, ginger ale, broth or 7-Up.  Rest may also help.  Be sure to drink enough fluids.  Move to a regular diet as you feel able. Take pain medications with a small amount of solid food, such as toast or crackers, to avoid nausea.   A slight fever is normal. Call the doctor if your fever is over 100 F (37.7 C) (taken under the tongue) or lasts longer than 24 hours.  You may have a dry mouth, muscle aches, trouble sleeping or a sore throat.  These symptoms should go away after 24 hours.  Do not make important or legal decisions.   Pain Management:      1. Take pain medication (if prescribed) for pain as directed by your physician.        2. WARNING: If the pain medication you have been prescribed contains Tylenol  (acetaminophen), DO NOT take additional doses of Tylenol (acetaminophen).     Call your doctor for any of the followin.  Signs of infection (fever, growing tenderness at the surgery site, severe pain, a large amount of drainage or bleeding, foul-smelling drainage, redness, swelling).    2.  It has been over 8 to 10 hours since surgery and you are still not able to urinate (pee).    3.  Headache for over 24  hours.    4.  Numbness, tingling or weakness the day after surgery (if you had spinal anesthesia).  To contact a doctor, call _____________________________________ or:  '   [phone] and ask for the Resident On Call for:          __________________________________________ (answered 24 hours a day)  '   Emergency Department:  [facility]: [phone]  [facility]: [phone]               Rev. 10/2014       Ibuprofen last at 3:33 PM, next dose OK in 6 hours at 9:33 PM, then follow instructions on the bottle.     [place] [address] [address]    Appointment tomorrow 11/1/22; at 1:00 PM

## 2022-10-31 NOTE — ANESTHESIA POSTPROCEDURE EVALUATION
Patient: Barak Soto    Procedure: Procedure(s):  LEFT and  RIGHT PHACOEMULSIFICATION, CATARACT, WITH INTRAOCULAR LENS IMPLANT       Anesthesia Type:  General    Note:  Disposition: Outpatient   Postop Pain Control: Uneventful            Sign Out: Well controlled pain   PONV: No   Neuro/Psych: Uneventful            Sign Out: Acceptable/Baseline neuro status   Airway/Respiratory: Uneventful            Sign Out: Acceptable/Baseline resp. status   CV/Hemodynamics: Uneventful            Sign Out: Acceptable CV status   Other NRE: NONE   DID A NON-ROUTINE EVENT OCCUR? No           Last vitals:  Vitals Value Taken Time   /76 10/31/22 1645   Temp 36.7  C (98.1  F) 10/31/22 1645   Pulse 70 10/31/22 1645   Resp 11 10/31/22 1645   SpO2 97 % 10/31/22 1645       Electronically Signed By: Amari Philippe MD  October 31, 2022  6:01 PM

## 2022-11-01 ENCOUNTER — OFFICE VISIT (OUTPATIENT)
Dept: OPHTHALMOLOGY | Facility: CLINIC | Age: 77
End: 2022-11-01
Attending: OPHTHALMOLOGY
Payer: COMMERCIAL

## 2022-11-01 DIAGNOSIS — Z96.1 PSEUDOPHAKIA: Primary | ICD-10-CM

## 2022-11-01 PROCEDURE — 99024 POSTOP FOLLOW-UP VISIT: CPT | Performed by: OPHTHALMOLOGY

## 2022-11-01 RX ORDER — ACETAMINOPHEN 500 MG
500 TABLET ORAL EVERY 6 HOURS PRN
Qty: 90 TABLET | Refills: 1 | Status: SHIPPED | OUTPATIENT
Start: 2022-11-01

## 2022-11-01 ASSESSMENT — SLIT LAMP EXAM - LIDS
COMMENTS: NORMAL
COMMENTS: NORMAL

## 2022-11-07 NOTE — PROGRESS NOTES
Pseudophakia, both eyes, day 1  [place] both eyes 10/31/22   Doing well  Keep patch in place for 7 days  Start post-operative drops and taper according to instructions  Post-operative do's and don'ts reviewed, questions answered    Recheck 1 week    Attending Physician Attestation:  Complete documentation of historical and exam elements from today's encounter can be found in the full encounter summary report (not reduplicated in this progress note).  I personally obtained the chief complaint(s) and history of present illness.  I confirmed and edited as necessary the review of systems, past medical/surgical history, family history, social history, and examination findings as documented by others; and I examined the patient myself.  I personally reviewed the relevant tests, images, and reports as documented above.  I formulated and edited as necessary the assessment and plan and discussed the findings and management plan with the patient and family. - Abdifatah Ku MD

## 2022-11-16 ENCOUNTER — OFFICE VISIT (OUTPATIENT)
Dept: OPHTHALMOLOGY | Facility: CLINIC | Age: 77
End: 2022-11-16
Attending: OPHTHALMOLOGY
Payer: COMMERCIAL

## 2022-11-16 DIAGNOSIS — Z96.1 PSEUDOPHAKIA OF BOTH EYES: Primary | ICD-10-CM

## 2022-11-16 PROCEDURE — 99024 POSTOP FOLLOW-UP VISIT: CPT | Performed by: OPHTHALMOLOGY

## 2022-11-16 ASSESSMENT — SLIT LAMP EXAM - LIDS: COMMENTS: NORMAL

## 2022-11-16 ASSESSMENT — VISUAL ACUITY: METHOD: SNELLEN - LINEAR

## 2022-11-16 NOTE — PROGRESS NOTES
Review of systems for the eyes was negative other than the pertinent positives/negatives listed in the HPI.      Assessment & Plan    HPI:  Barak Houser is a 76 year old male with history of Alzheimer's Dementia and bladder cancer presents for POW2 Cataract extraction/iol both eyes. Doing great. He is walking upright and focusing on faces.  He is able to eat by himself without assistance.       POHx:   PMHx:   Current Medications: acetaminophen (TYLENOL) 500 MG tablet, Take 1 tablet (500 mg) by mouth every 6 hours as needed for mild pain or other (headache)  aspirin (ASA) 81 MG chewable tablet, Take 81 mg by mouth  atorvastatin (LIPITOR) 40 MG tablet, Take 40 mg by mouth  diazepam (VALIUM) 10 MG tablet, TAKE 1 TABLET BY MOUTH 30 MINUTES PRIOR TO MRI  donepezil (ARICEPT) 10 MG tablet, Take 10 mg by mouth  folic acid (FOLVITE) 1 MG tablet, Take 1 mg by mouth  loperamide (IMODIUM) 2 MG capsule, TAKE 2 CAPSULES BY MOUTH INITIALLY, THEN ONE CAPSULE BY MOUTH AFTER EACH LOOSE STOOL. MAXIMUM 8 DOSES PER DAY.  memantine (NAMENDA) 10 MG tablet, Take 1 tablet by mouth 2 times daily  methotrexate 2.5 MG tablet, every 7 days 4 tabs every Tuesday    No current facility-administered medications on file prior to visit.    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        Abdifatah Ku MD     Attending Physician Attestation:  Complete documentation of historical and exam elements from today's encounter can be found in the full encounter summary report (not reduplicated in this progress note).  I personally obtained the chief complaint(s) and history of present illness.  I confirmed and edited as necessary the review of systems, past medical/surgical history, family history, social history, and examination findings as documented by others; and I examined the patient myself.  I personally reviewed the relevant tests, images, and reports as documented above.  I formulated and edited as necessary the assessment and plan and discussed the findings and management plan with the patient and family. - Abdifatah Ku MD

## 2022-11-23 ENCOUNTER — TELEPHONE (OUTPATIENT)
Dept: OPHTHALMOLOGY | Facility: CLINIC | Age: 77
End: 2022-11-23

## 2022-11-23 NOTE — TELEPHONE ENCOUNTER
[facility] Call Center    Phone Message    May a detailed message be left on voicemail: yes     Reason for Call: Order(s): Home Care Orders: Other: Patient's spouse states [facility] needs orders for the patient's postop eye drops.   Fax #  unknown # [phone] option 4    Action Taken: Message routed to:  [facility] Ophthalmology    Travel Screening: Not Applicable

## 2022-11-23 NOTE — TELEPHONE ENCOUNTER
[facility] Call Center    Phone Message    May a detailed message be left on voicemail: yes     Reason for Call: Order(s): Home Care Orders: Other: Need eye drop orders  Other: Assisted Living      Please fax eye drop orders to [facility] at [phone].    Action Taken: Message routed to:  [facility] Eye    Travel Screening: Not Applicable

## 2022-11-23 NOTE — TELEPHONE ENCOUNTER
LM relaying below:    Abdifatah Ku MD  [facility] 11 minutes ago (11:54 AM)     EG  He has a combo drop of an nsaid, steroid and antibiotic. I can't recall which pharmacy he ended up using but he should be on a taper where it's once daily right now. If he's done with all drops, he can stop      Elidia Rodrigues, COT 12:08 PM 11/23/2022

## 2022-11-29 ENCOUNTER — TELEPHONE (OUTPATIENT)
Dept: OPHTHALMOLOGY | Facility: CLINIC | Age: 77
End: 2022-11-29

## 2022-11-29 NOTE — TELEPHONE ENCOUNTER
Memory care nursing needing initial orders for documentation of eye drops per discharge instructions from hospital stay    Reviewed epic notes from 10- and not able to locate the discharge instructions from hospital with eye drop regimen    Provided medical records number to see if would be able to assist    Henrique Hernandez RN 11:30 AM 11/29/22

## 2022-12-09 ENCOUNTER — OFFICE VISIT (OUTPATIENT)
Dept: OPHTHALMOLOGY | Facility: CLINIC | Age: 77
End: 2022-12-09
Payer: COMMERCIAL

## 2022-12-09 DIAGNOSIS — Z96.1 PSEUDOPHAKIA OF BOTH EYES: Primary | ICD-10-CM

## 2022-12-09 DIAGNOSIS — H52.4 MYOPIA OF BOTH EYES WITH ASTIGMATISM AND PRESBYOPIA: ICD-10-CM

## 2022-12-09 DIAGNOSIS — H52.203 MYOPIA OF BOTH EYES WITH ASTIGMATISM AND PRESBYOPIA: ICD-10-CM

## 2022-12-09 DIAGNOSIS — H52.13 MYOPIA OF BOTH EYES WITH ASTIGMATISM AND PRESBYOPIA: ICD-10-CM

## 2022-12-09 PROCEDURE — 99024 POSTOP FOLLOW-UP VISIT: CPT | Performed by: OPHTHALMOLOGY

## 2022-12-09 ASSESSMENT — REFRACTION_MANIFEST
OD_SPHERE: -0.75
OD_AXIS: 032
OS_SPHERE: -1.75
OS_AXIS: 164
METHOD_AUTOREFRACTION: 1
OD_CYLINDER: +1.00
OS_CYLINDER: +1.00

## 2022-12-09 ASSESSMENT — SLIT LAMP EXAM - LIDS: COMMENTS: NORMAL

## 2022-12-09 ASSESSMENT — TONOMETRY: IOP_METHOD: BOTH EYES NORMAL BY PALPATION

## 2022-12-09 NOTE — NURSING NOTE
Chief Complaints and History of Present Illnesses   Patient presents with     Post Op (Ophthalmology) Both Eyes       Chief Complaint(s) and History of Present Illness(es)     Post Op (Ophthalmology) Both Eyes            Laterality: both eyes          Comments    S/p cat surgery each eye 10/31/22. No problems/concerns. Done with drops                 Magaly Mills, COA

## 2022-12-09 NOTE — PROGRESS NOTES
HPI     Post Op (Ophthalmology) Both Eyes    In both eyes.           Comments    S/p cat surgery each eye 10/31/22. No problems/concerns. Done with drops          Last edited by Magaly Mills COA on 12/9/2022  8:55 AM.         Review of systems for the eyes was negative other than the pertinent positives/negatives listed in the HPI.      Assessment & Plan    HPI:  Barak Houser is a 76 year old male with history of Alzheimer's Dementia and bladder cancer presents for final POW5 Cataract extraction/iol both eyes. Doing great. He is walking upright and focusing on faces.  He is able to eat by himself without assistance.       POHx: myopia with astigmatism  PMHx:  Alzheimer's dementia, htn, hld  Current Medications: acetaminophen (TYLENOL) 500 MG tablet, Take 1 tablet (500 mg) by mouth every 6 hours as needed for mild pain or other (headache)  aspirin (ASA) 81 MG chewable tablet, Take 81 mg by mouth  diazepam (VALIUM) 10 MG tablet, TAKE 1 TABLET BY MOUTH 30 MINUTES PRIOR TO MRI  donepezil (ARICEPT) 10 MG tablet, Take 10 mg by mouth  folic acid (FOLVITE) 1 MG tablet, Take 1 mg by mouth  loperamide (IMODIUM) 2 MG capsule, TAKE 2 CAPSULES BY MOUTH INITIALLY, THEN ONE CAPSULE BY MOUTH AFTER EACH LOOSE STOOL. MAXIMUM 8 DOSES PER DAY.  memantine (NAMENDA) 10 MG tablet, Take 1 tablet by mouth 2 times daily  methotrexate 2.5 MG tablet, every 7 days 4 tabs every Tuesday  atorvastatin (LIPITOR) 40 MG tablet, Take 40 mg by mouth    No current facility-administered medications on file prior to visit.    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        Abdifatah Ku MD     Attending Physician Attestation:  Complete documentation of historical and exam elements from today's encounter can be found in the full encounter summary report (not reduplicated in this progress note).  I personally obtained the chief complaint(s) and history of present illness.  I confirmed and edited as necessary the review of systems, past medical/surgical history, family history, social history, and examination findings as documented by others; and I examined the patient myself.  I personally reviewed the relevant tests, images, and reports as documented above.  I formulated and edited as necessary the assessment and plan and discussed the findings and management plan with the patient and family. - Abdifatah Ku MD

## 2023-03-24 ENCOUNTER — OFFICE VISIT (OUTPATIENT)
Dept: OPHTHALMOLOGY | Facility: CLINIC | Age: 78
End: 2023-03-24
Payer: COMMERCIAL

## 2023-03-24 DIAGNOSIS — H01.01B ULCERATIVE BLEPHARITIS OF UPPER AND LOWER EYELIDS OF BOTH EYES: Primary | ICD-10-CM

## 2023-03-24 DIAGNOSIS — H01.01A ULCERATIVE BLEPHARITIS OF UPPER AND LOWER EYELIDS OF BOTH EYES: Primary | ICD-10-CM

## 2023-03-24 PROCEDURE — 99212 OFFICE O/P EST SF 10 MIN: CPT | Performed by: OPHTHALMOLOGY

## 2023-03-24 ASSESSMENT — VISUAL ACUITY
OD_SC: FIX AND FOLLOW
METHOD: SNELLEN - LINEAR
OS_SC: FIX AND FOLLOW

## 2023-03-24 ASSESSMENT — SLIT LAMP EXAM - LIDS: COMMENTS: 2+ BLEPHARITIS, COLLARETTES

## 2023-03-24 NOTE — NURSING NOTE
Chief Complaints and History of Present Illnesses   Patient presents with     Tearing Evaluation     Chief Complaint(s) and History of Present Illness(es)     Tearing Evaluation            Laterality: both eyes    Characteristics: sudden onset    Associated symptoms: red eyes (improving) and itching.  Negative for irritation and discharge          Comments    Here for excessive tearing x5 days. Patient's wife thinks may be sinus related - nose is also running. Been using PFATs at bedtime which does not seem to be helping. There is some possible itching - patient has been rubbing eyes. Patient's wife has been helping with lid scrubs at bedtime.    Rogelio Brown COT 2:01 PM March 24, 2023

## 2023-03-24 NOTE — PROGRESS NOTES
HPI     Tearing Evaluation    In both eyes.  Characterized as sudden onset.  Associated symptoms include red eyes (improving) and itching.  Negative for irritation and discharge.           Comments    Here for excessive tearing x5 days. Patient's wife thinks may be sinus related - nose is also running. Been using PFATs at bedtime which does not seem to be helping. There is some possible itching - patient has been rubbing eyes. Patient's wife has been helping with lid scrubs at bedtime.    Rogelio Brown COT 2:01 PM March 24, 2023             Last edited by Rogelio Brown on 3/24/2023  2:06 PM.         Review of systems for the eyes was negative other than the pertinent positives/negatives listed in the HPI.      Assessment & Plan    HPI:  Barak Houser is a 77 year old male with history of Alzheimer's Dementia and bladder cancer presents with tearing both eyes and am crusting.       POHx: myopia with astigmatism  PMHx:  Alzheimer's dementia, htn, hld  Current Medications: acetaminophen (TYLENOL) 500 MG tablet, Take 1 tablet (500 mg) by mouth every 6 hours as needed for mild pain or other (headache)  aspirin (ASA) 81 MG chewable tablet, Take 81 mg by mouth  atorvastatin (LIPITOR) 40 MG tablet, Take 40 mg by mouth  diazepam (VALIUM) 10 MG tablet, TAKE 1 TABLET BY MOUTH 30 MINUTES PRIOR TO MRI  donepezil (ARICEPT) 10 MG tablet, Take 10 mg by mouth  folic acid (FOLVITE) 1 MG tablet, Take 1 mg by mouth  memantine (NAMENDA) 10 MG tablet, Take 1 tablet by mouth 2 times daily  methotrexate 2.5 MG tablet, every 7 days 4 tabs every Tuesday    No current facility-administered medications on file prior to visit.    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        Abdifatah Ku MD     Attending Physician Attestation:  Complete documentation of historical and exam elements from today's encounter can be found in the full encounter summary report (not reduplicated in this progress note).  I personally obtained the chief complaint(s) and history of present illness.  I confirmed and edited as necessary the review of systems, past medical/surgical history, family history, social history, and examination findings as documented by others; and I examined the patient myself.  I personally reviewed the relevant tests, images, and reports as documented above.  I formulated and edited as necessary the assessment and plan and discussed the findings and management plan with the patient and family. - Abdifatah Ku MD

## 2023-07-30 NOTE — TELEPHONE ENCOUNTER
They called twice.  This was addressed on the other telephone encounter with today's date.    DAYNE Cook 12:10 PM 11/23/2022       30-Jul-2023

## 2023-10-10 ENCOUNTER — NURSE TRIAGE (OUTPATIENT)
Dept: NURSING | Facility: CLINIC | Age: 78
End: 2023-10-10
Payer: COMMERCIAL

## 2023-10-10 NOTE — TELEPHONE ENCOUNTER
Spoke to family member-- [phone]    White discharge left eye    Redness on left eye     Pt wipes eye quite a bit.    No noted vision changes-- pt in memory care.    Symptom present for about 2 weeks.    Not as much preservative free tear use per family member recently as pt tends to wipe out the tears.    Recommended continuing with PF systane ultra and warm compresses (family member place warm, clean washcloth over eyes).    Pt normally seen at  clinic with Dr. Ku.    Reviewed would message Dr. Ku/team to see if able to see this Friday at  clinic and family member seemed satisfied with plan      Henrique Hernandez RN 1:36 PM 10/10/23

## 2023-10-10 NOTE — TELEPHONE ENCOUNTER
"  Nurse Triage SBAR    Is this a 2nd Level Triage? YES, LICENSED PRACTITIONER REVIEW IS REQUIRED    Situation: Left eye red and rubbing a lot.  Mucous drainage at corner of eye.  Possible infection?  Pt is Memory Care / Alzheimers care patient.    Background:   Cataract surgery with Dr. Ku    Assessment:   Unable to determine if patient in pain, wife does not believe he is in pain.  Rubs his left eye a lot  White mucousy drainage at the corner of the eye  His wife thinks that his pupil looks odd.    Protocol Recommended Disposition:   Routing to EYE      Routed to provider        Reason for Disposition   Patient wants to be seen    Additional Information   Negative: Complete loss of vision in one or both eyes   Negative: SEVERE eye pain   Negative: SEVERE headache   Negative: Double vision   Negative: Blurred vision or visual changes and present now and sudden onset or new (e.g., minutes, hours, days)  (Exception: Seeing floaters / black specks OR previously diagnosed migraine headaches with same symptoms.)   Negative: Patient sounds very sick or weak to the triager   Negative: Flashes of light  (Exception: Brief from pressing on the eyeball.)   Negative: Many floaters in the eye (Exception: Floater(s) are a chronic symptom and this is unchanged from patient's baseline pattern.)   Negative: Eye pain and brief (now gone) blurred vision or visual changes   Negative: Taking digoxin (e.g., Lanoxin, Digitek, Cardoxin, Lanoxicaps; Toloxin in [place]) and blurred vision, yellow vision, or yellow-green halos    Answer Assessment - Initial Assessment Questions  1. DESCRIPTION: \"How has your vision changed?\" (e.g., complete vision loss, blurred vision, double vision, floaters, etc.)      Alzheimers, Memory care patient.  White mucuosy in corner of left eye, eye shut most of the time.  Rubs his eyes alot  2. LOCATION: \"One or both eyes?\" If one, ask: \"Which eye?\"      Left eye  3. SEVERITY: \"Can you see anything?\" If " "Yes, ask: \"What can you see?\" (e.g., fine print)   Unable to determine it.  Able to watch tv, take a glass of water offered.   4. ONSET: \"When did this begin?\" \"Did it start suddenly or has this been gradual?\"      Not really sure when this started as he is a Memory care person.  5. PATTERN: \"Does this come and go, or has it been constant since it started?\"      constant  6. PAIN: \"Is there any pain in your eye(s)?\"  (Scale 1-10; or mild, moderate, severe)    - NONE (0): No pain.    - MILD (1-3): Doesn't interfere with normal activities.    - MODERATE (4-7): Interferes with normal activities or awakens from sleep.     - SEVERE (8-10): Excruciating pain, unable to do any normal activities.      Does not appear to be in pain, not verbal  7. CONTACTS-GLASSES: \"Do you wear contacts or glasses?\"      no  8. CAUSE: \"What do you think is causing this visual problem?\"      None or maybe rubbing his eyes.  Hands not always sanitary.  9. OTHER SYMPTOMS: \"Do you have any other symptoms?\" (e.g., confusion, headache, arm or leg weakness, speech problems)      No change in current status  10. PREGNANCY: \"Is there any chance you are pregnant?\" \"When was your last menstrual period?\"        N/o    Protocols used: Vision Loss or Change-A-OH    "

## 2023-10-10 NOTE — TELEPHONE ENCOUNTER
(Family confirmed Friday appt at 1412 Henrique Hernandez RN 2:58 PM 10/10/23)    -    Scheduled at 0800 at  clinic with Dr. Ku this Friday    Left message with tentative scheduling at 1442 for this Friday, October 13th at 0800 at  eye clinic.    Provided direct number to review/confirm appt    Henrique Hernandez RN 1:44 PM 10/10/23

## 2023-10-13 ENCOUNTER — OFFICE VISIT (OUTPATIENT)
Dept: OPHTHALMOLOGY | Facility: CLINIC | Age: 78
End: 2023-10-13
Payer: COMMERCIAL

## 2023-10-13 DIAGNOSIS — H04.123 DRY EYE SYNDROME OF BOTH EYES: Primary | ICD-10-CM

## 2023-10-13 PROCEDURE — 99214 OFFICE O/P EST MOD 30 MIN: CPT | Performed by: OPHTHALMOLOGY

## 2023-10-13 ASSESSMENT — VISUAL ACUITY
OS_SC: FIX AND FOLLOW
OD_SC: FIX AND FOLLOW

## 2023-10-13 ASSESSMENT — SLIT LAMP EXAM - LIDS: COMMENTS: NORMAL

## 2023-10-13 NOTE — PROGRESS NOTES
HPI       Red Eye Left Eye    In left eye.             Comments    Patient presents with with concern of red left eye with some discharge. Pts wife states she has been using warm washcloths to clean around the eye. Has discharge usually around nasal corner. Has also used ocusoft lid scrubs and PF systane. Symptoms have been present for about 2 weeks.           Last edited by Magaly Mills COT on 10/13/2023  8:00 AM.         Review of systems for the eyes was negative other than the pertinent positives/negatives listed in the HPI.      Assessment & Plan    HPI:  Barak Houser is a 77 year old male with history of Alzheimer's Dementia and bladder cancer presents with am discharge and mucous x 2 weeks. Improved with warm compress      POHx: myopia with astigmatism  PMHx:  Alzheimer's dementia, htn, hld  Current Medications: acetaminophen (TYLENOL) 500 MG tablet, Take 1 tablet (500 mg) by mouth every 6 hours as needed for mild pain or other (headache)  aspirin (ASA) 81 MG chewable tablet, Take 81 mg by mouth  atorvastatin (LIPITOR) 40 MG tablet, Take 40 mg by mouth  donepezil (ARICEPT) 10 MG tablet, Take 10 mg by mouth  memantine (NAMENDA) 10 MG tablet, Take 1 tablet by mouth 2 times daily  diazepam (VALIUM) 10 MG tablet, TAKE 1 TABLET BY MOUTH 30 MINUTES PRIOR TO MRI (Patient not taking: Reported on 10/13/2023)  folic acid (FOLVITE) 1 MG tablet, Take 1 mg by mouth (Patient not taking: Reported on 10/13/2023)  methotrexate 2.5 MG tablet, every 7 days 4 tabs every Tuesday (Patient not taking: Reported on 10/13/2023)    No current facility-administered medications on file prior to visit.    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        Abdifatah Ku MD     Attending Physician Attestation:  Complete documentation of historical and exam elements from today's encounter can be found in the full encounter summary report (not reduplicated in this progress note).  I personally obtained the chief complaint(s) and history of present illness.  I confirmed and edited as necessary the review of systems, past medical/surgical history, family history, social history, and examination findings as documented by others; and I examined the patient myself.  I personally reviewed the relevant tests, images, and reports as documented above.  I formulated and edited as necessary the assessment and plan and discussed the findings and management plan with the patient and family. - Abdifatah Ku MD

## 2023-10-13 NOTE — NURSING NOTE
Chief Complaints and History of Present Illnesses   Patient presents with    Red Eye Left Eye       Chief Complaint(s) and History of Present Illness(es)       Red Eye Left Eye              Laterality: left eye              Comments    Patient presents with with concern of red left eye with some discharge. Pts wife states she has been using warm washcloths to clean around the eye. Has discharge usually around nasal corner. Has also used ocusoft lid scrubs and PF systane. Symptoms have been present for about 2 weeks.                    Magaly Mills, COT

## 2023-10-23 ENCOUNTER — DOCUMENTATION ONLY (OUTPATIENT)
Dept: CARE COORDINATION | Facility: CLINIC | Age: 78
End: 2023-10-23
Payer: COMMERCIAL

## 2023-10-23 NOTE — PROGRESS NOTES
Social Work - Follow-Up  [facility]    Data/Intervention:    Patient Name: Barak Soto Goes By: Barak    /Age: 1945 (77 year old)    Chart opened in verifying that a health care directive I located for Barak has been scanned into his chart.       NAEL Bergeron, [facility]    [facility]  Ph: [phone], Pgr: [phone]  10/23/2023

## 2023-12-11 ENCOUNTER — TELEPHONE (OUTPATIENT)
Dept: OPHTHALMOLOGY | Facility: CLINIC | Age: 78
End: 2023-12-11

## 2023-12-11 ENCOUNTER — OFFICE VISIT (OUTPATIENT)
Dept: OPHTHALMOLOGY | Facility: CLINIC | Age: 78
End: 2023-12-11
Payer: COMMERCIAL

## 2023-12-11 DIAGNOSIS — Z96.1 PSEUDOPHAKIA OF BOTH EYES: Primary | ICD-10-CM

## 2023-12-11 DIAGNOSIS — H01.01A ULCERATIVE BLEPHARITIS OF UPPER AND LOWER EYELIDS OF BOTH EYES: ICD-10-CM

## 2023-12-11 DIAGNOSIS — H01.01B ULCERATIVE BLEPHARITIS OF UPPER AND LOWER EYELIDS OF BOTH EYES: ICD-10-CM

## 2023-12-11 DIAGNOSIS — H04.123 DRY EYE SYNDROME OF BOTH EYES: ICD-10-CM

## 2023-12-11 PROCEDURE — 92014 COMPRE OPH EXAM EST PT 1/>: CPT | Performed by: OPHTHALMOLOGY

## 2023-12-11 RX ORDER — TRIAMCINOLONE ACETONIDE 1 MG/G
CREAM TOPICAL
COMMUNITY

## 2023-12-11 ASSESSMENT — VISUAL ACUITY
OS_SC: FIX AND FOLLOW
METHOD: SNELLEN - LINEAR
METHOD_MR_RETINOSCOPY: 1
OD_SC: FIX AND FOLLOW

## 2023-12-11 ASSESSMENT — CONF VISUAL FIELD: COMMENTS: UNABLE DUE TO COOPERATION

## 2023-12-11 ASSESSMENT — SLIT LAMP EXAM - LIDS: COMMENTS: 2+ DERMATOCHALASIS

## 2023-12-11 ASSESSMENT — REFRACTION_MANIFEST
OS_SPHERE: -0.50
OD_SPHERE: -1.25

## 2023-12-11 ASSESSMENT — CUP TO DISC RATIO: OS_RATIO: 0.4

## 2023-12-11 ASSESSMENT — TONOMETRY: IOP_METHOD: BOTH EYES NORMAL BY PALPATION

## 2023-12-11 NOTE — NURSING NOTE
Chief Complaints and History of Present Illnesses   Patient presents with    Dry Eye Syndrome Follow Up       Chief Complaint(s) and History of Present Illness(es)       Dry Eye Syndrome Follow Up              Laterality: both eyes              Comments    Patient here for dry eye follow up, annual exam. Using heated mask every evening for the last couple of weeks. Using PF Systane and ocusoft lid scrubs-done by pts wife every evening.   Seems as though pt has been experiencing vertigo due to medication change. Not walking as well, more off balanced.                    Magaly Mills, COT

## 2023-12-11 NOTE — TELEPHONE ENCOUNTER
Left Voicemail (1st Attempt) for the patient to call back and schedule the following:    Appointment type: return  Provider: dr. campos  Return date: 12/11/2024  Specialty phone number: [phone]   Additonal Notes: Return in about 1 year (around 12/11/2024) for Annual Visit-v/t/d/Monae willams Procedure   Orthopedics, Podiatry, Sports Medicine, Ent ,Eye , Audiology, Adult Endocrine & Diabetes, Nutrition & Medication Therapy Management Specialties   [facility]

## 2023-12-11 NOTE — PROGRESS NOTES
HPI       Dry Eye Syndrome Follow Up    In both eyes.             Comments    Patient here for dry eye follow up, annual exam. Using heated mask every evening for the last couple of weeks. Using PF Systane and ocusoft lid scrubs-done by pts wife every evening.   Seems as though pt has been experiencing vertigo due to medication change. Not walking as well, more off balanced.           Last edited by Magaly Mills COT on 12/11/2023 10:55 AM.         Review of systems for the eyes was negative other than the pertinent positives/negatives listed in the HPI.      Assessment & Plan    HPI:  Barak Houser is a 77 year old male with history of Alzheimer's Dementia and bladder cancer presents with for annual exam. Doing well with warm compress. He has stopped walking due to recent ?vertigo symptoms. Eye is comfortable       POHx: myopia with astigmatism  PMHx:  Alzheimer's dementia, htn, hld  Current Medications: acetaminophen (TYLENOL) 500 MG tablet, Take 1 tablet (500 mg) by mouth every 6 hours as needed for mild pain or other (headache)  memantine (NAMENDA) 10 MG tablet, Take 1 tablet by mouth 2 times daily  triamcinolone (KENALOG) 0.1 % external cream, APPLY TO THE AREAS OF RASH OR IRRITATION TWICE DAILY THEN AS NEEDED ONCE IRRITATION RESOLVED  aspirin (ASA) 81 MG chewable tablet, Take 81 mg by mouth (Patient not taking: Reported on 12/11/2023)  atorvastatin (LIPITOR) 40 MG tablet, Take 40 mg by mouth  diazepam (VALIUM) 10 MG tablet, TAKE 1 TABLET BY MOUTH 30 MINUTES PRIOR TO MRI (Patient not taking: Reported on 10/13/2023)  donepezil (ARICEPT) 10 MG tablet, Take 10 mg by mouth (Patient not taking: Reported on 12/11/2023)  folic acid (FOLVITE) 1 MG tablet, Take 1 mg by mouth (Patient not taking: Reported on 10/13/2023)  methotrexate 2.5 MG tablet, every 7 days 4 tabs every Tuesday (Patient not taking: Reported on 10/13/2023)    No current facility-administered medications on file prior to visit.    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        Abdifatah Ku MD     Attending Physician Attestation:  Complete documentation of historical and exam elements from today's encounter can be found in the full encounter summary report (not reduplicated in this progress note).  I personally obtained the chief complaint(s) and history of present illness.  I confirmed and edited as necessary the review of systems, past medical/surgical history, family history, social history, and examination findings as documented by others; and I examined the patient myself.  I personally reviewed the relevant tests, images, and reports as documented above.  I formulated and edited as necessary the assessment and plan and discussed the findings and management plan with the patient and family. - Abdifatah Ku MD

## 2024-06-11 ENCOUNTER — TELEPHONE (OUTPATIENT)
Dept: OPHTHALMOLOGY | Facility: CLINIC | Age: 79
End: 2024-06-11
Payer: COMMERCIAL

## 2024-06-11 NOTE — TELEPHONE ENCOUNTER
Left Voicemail (2nd Attempt) for the patient to call back and schedule the following:    Appointment type: return  Provider: dr. campos  Return date: 12/11/2024   Specialty phone number: [phone]  Additonal Notes: Return in about 1 year (around 12/11/2024) for Annual Visit-v/t/albert/Koby willams Complex   Orthopedics, Podiatry, Sports Medicine, Ent ,Eye , Audiology, Adult Endocrine & Diabetes, Nutrition & Medication Therapy Management Specialties   [facility]

## (undated) DEVICE — EYE KNIFE SLIT XSTAR VISITEC 2.6MM 45DEG 373726

## (undated) DEVICE — EYE CANN IRR 25GA CYSTOTOME 581610

## (undated) DEVICE — EYE SHIELD FOX  W/GARTER ADULT 202

## (undated) DEVICE — EYE CANN IRR 30GA  ANTERIOR CHAMBER 581273

## (undated) DEVICE — TAPE TRANSPORE 1"X1.5YD 1534S-1

## (undated) DEVICE — EYE PREP BETADINE 5% SOLUTION 30ML 0065-0411-30

## (undated) DEVICE — GLOVE PROTEXIS MICRO 7.5  2D73PM75

## (undated) DEVICE — SU ETHILON 10-0 TG-160-4DA 12" 7707G

## (undated) DEVICE — EYE KNIFE STILETTO VISITEC 1.1MM ANG 45DEG SIDEPORT 376620

## (undated) DEVICE — SYR 01ML LL W/O NDL LATEX FREE 309628

## (undated) DEVICE — STRAP KNEE/BODY 31143004

## (undated) DEVICE — LINEN TOWEL PACK X5 5464

## (undated) DEVICE — PACK CATARACT UMMC

## (undated) DEVICE — EYE CANN IRRIG CORTICAL CLVNG HYDRDISCTR 27GAX7/8" BC585158

## (undated) DEVICE — EYE PACK CUSTOM ANTERIOR 30DEG TIP CENTURION PPK6682-04

## (undated) DEVICE — SOL NACL 0.9% 10ML VIAL 0409-4888-02

## (undated) DEVICE — EYE TIP IRRIGATION & ASPIRATION POLYMER 35D BENT 8065751511

## (undated) DEVICE — EYE LENS CARTRIDGE D ALCON MONARCH

## (undated) DEVICE — POSITIONER ARMBOARD FOAM 1PAIR LF FP-ARMB1

## (undated) DEVICE — EYE CANN IRR 27GA ANTERIOR CHAMBER 581280

## (undated) RX ORDER — EPHEDRINE SULFATE 50 MG/ML
INJECTION, SOLUTION INTRAMUSCULAR; INTRAVENOUS; SUBCUTANEOUS
Status: DISPENSED
Start: 2022-10-31

## (undated) RX ORDER — MOXIFLOXACIN 5 MG/ML
SOLUTION/ DROPS OPHTHALMIC
Status: DISPENSED
Start: 2022-10-31

## (undated) RX ORDER — BALANCED SALT SOLUTION 6.4; .75; .48; .3; 3.9; 1.7 MG/ML; MG/ML; MG/ML; MG/ML; MG/ML; MG/ML
SOLUTION OPHTHALMIC
Status: DISPENSED
Start: 2022-10-31

## (undated) RX ORDER — FENTANYL CITRATE 50 UG/ML
INJECTION, SOLUTION INTRAMUSCULAR; INTRAVENOUS
Status: DISPENSED
Start: 2022-10-31

## (undated) RX ORDER — CYCLOPENTOLAT/TROPIC/PHENYLEPH 1%-1%-2.5%
DROPS (EA) OPHTHALMIC (EYE)
Status: DISPENSED
Start: 2022-10-31

## (undated) RX ORDER — PROPARACAINE HYDROCHLORIDE 5 MG/ML
SOLUTION/ DROPS OPHTHALMIC
Status: DISPENSED
Start: 2022-10-31